# Patient Record
Sex: FEMALE | Race: WHITE | NOT HISPANIC OR LATINO | Employment: FULL TIME | URBAN - NONMETROPOLITAN AREA
[De-identification: names, ages, dates, MRNs, and addresses within clinical notes are randomized per-mention and may not be internally consistent; named-entity substitution may affect disease eponyms.]

---

## 2023-12-26 PROBLEM — I48.0 PAROXYSMAL ATRIAL FIBRILLATION (MULTI): Status: ACTIVE | Noted: 2023-12-26

## 2023-12-26 PROBLEM — C18.9 COLON CANCER (MULTI): Status: ACTIVE | Noted: 2023-12-26

## 2023-12-26 PROBLEM — Z86.711 HISTORY OF PULMONARY EMBOLUS (PE): Status: ACTIVE | Noted: 2023-12-26

## 2023-12-26 RX ORDER — MULTIVIT-MIN/IRON/FOLIC ACID/K 18-600-40
1 CAPSULE ORAL DAILY
COMMUNITY

## 2023-12-26 RX ORDER — UBIDECARENONE/VIT E ACET 100MG-5
1 CAPSULE ORAL DAILY
COMMUNITY

## 2023-12-26 RX ORDER — ACETAMINOPHEN 160 MG/5ML
1 SUSPENSION, ORAL (FINAL DOSE FORM) ORAL DAILY
COMMUNITY

## 2023-12-26 RX ORDER — SOTALOL HYDROCHLORIDE 80 MG/1
80 TABLET ORAL EVERY 12 HOURS
COMMUNITY
End: 2024-01-02 | Stop reason: SDUPTHER

## 2023-12-26 RX ORDER — ACETAMINOPHEN 500 MG
1 TABLET ORAL DAILY
COMMUNITY

## 2023-12-26 RX ORDER — ASPIRIN 325 MG
1 TABLET ORAL DAILY
COMMUNITY
Start: 2022-02-22

## 2024-01-02 ENCOUNTER — OFFICE VISIT (OUTPATIENT)
Dept: CARDIOLOGY | Facility: CLINIC | Age: 54
End: 2024-01-02
Payer: COMMERCIAL

## 2024-01-02 VITALS
HEIGHT: 65 IN | HEART RATE: 94 BPM | BODY MASS INDEX: 27.69 KG/M2 | SYSTOLIC BLOOD PRESSURE: 122 MMHG | WEIGHT: 166.2 LBS | DIASTOLIC BLOOD PRESSURE: 80 MMHG

## 2024-01-02 DIAGNOSIS — Z86.711 HISTORY OF PULMONARY EMBOLUS (PE): ICD-10-CM

## 2024-01-02 DIAGNOSIS — I48.0 PAROXYSMAL ATRIAL FIBRILLATION (MULTI): Primary | ICD-10-CM

## 2024-01-02 PROCEDURE — 99214 OFFICE O/P EST MOD 30 MIN: CPT | Performed by: INTERNAL MEDICINE

## 2024-01-02 PROCEDURE — 93000 ELECTROCARDIOGRAM COMPLETE: CPT | Performed by: INTERNAL MEDICINE

## 2024-01-02 PROCEDURE — 1036F TOBACCO NON-USER: CPT | Performed by: INTERNAL MEDICINE

## 2024-01-02 NOTE — PROGRESS NOTES
"Subjective   Desi Johnson is a 53 y.o. female       Chief Complaint    Annual Exam          HPI     Patient returns in follow-up of problems as noted.  She done well.  She appears to be maintaining sinus rhythm based on EKG and lack of arrhythmia symptomatology.  She is protected against a thromboembolic event because she is also on rivaroxaban not only for atrial fibrillation but also for her unprovoked pulmonary embolism which necessitates lifelong antithrombotic therapy.  Because of this we will continue all therapies as before and she has been educated regarding symptoms watch for and encouraged to call if they arise or occur.    Review of Systems   All other systems reviewed and are negative.         Visit Vitals  /80 (BP Location: Right arm, Patient Position: Sitting)   Pulse 94   Ht 1.651 m (5' 5\")   Wt 75.4 kg (166 lb 3.2 oz)   BMI 27.66 kg/m²   Smoking Status Former   BSA 1.86 m²    EKG done in office today     Objective   Physical Exam  Constitutional:       Appearance: Normal appearance. She is normal weight.   HENT:      Nose: Nose normal.   Neck:      Vascular: No carotid bruit.   Cardiovascular:      Rate and Rhythm: Normal rate.      Pulses: Normal pulses.      Heart sounds: Normal heart sounds.   Pulmonary:      Effort: Pulmonary effort is normal.   Abdominal:      General: Bowel sounds are normal.      Palpations: Abdomen is soft.   Genitourinary:     Rectum: Normal.   Musculoskeletal:         General: Normal range of motion.      Cervical back: Normal range of motion.      Right lower leg: No edema.      Left lower leg: No edema.   Skin:     General: Skin is warm and dry.   Neurological:      General: No focal deficit present.      Mental Status: She is alert.   Psychiatric:         Mood and Affect: Mood normal.         Behavior: Behavior normal.         Thought Content: Thought content normal.         Judgment: Judgment normal.         Current Medications    Current Outpatient Medications: "     ascorbic acid, vitamin C, 500 mg capsule, Take 1 capsule by mouth once daily., Disp: , Rfl:     aspirin 325 mg tablet, Take 1 tablet (325 mg) by mouth once daily., Disp: , Rfl:     cholecalciferol (Vitamin D-3) 50 mcg (2,000 unit) capsule, Take 1 capsule (50 mcg) by mouth once daily., Disp: , Rfl:     coenzyme Q-10 200 mg capsule, Take 1 capsule (200 mg) by mouth once daily., Disp: , Rfl:     resveratroL 100 mg capsule, Take 1 capsule by mouth once daily., Disp: , Rfl:     rivaroxaban (Xarelto) 20 mg tablet, Take 1 tablet (20 mg) by mouth once daily., Disp: , Rfl:     sotalol (Betapace) 80 mg tablet, Take 1 tablet (80 mg) by mouth every 12 hours., Disp: , Rfl:                      Assessment/Plan   1. Paroxysmal atrial fibrillation (CMS/HCC)  No recurrence on sotalol therapy.  Continue same.  QT interval acceptable.    2. History of pulmonary embolus (PE)  Unprovoked, necessitating lifelong antithrombotic therapy.  This antithrombotic therapy also mitigates risk of stroke associated with atrial fibrillation.         Scribe Attestation  By signing my name below, I, Ani Quarles LPN   attest that this documentation has been prepared under the direction and in the presence of Herman Melgar MD.

## 2024-01-02 NOTE — LETTER
January 2, 2024     Jamir Andrew, DO  [address] [place] [address]    Patient: Desi Johnson   YOB: 1970   Date of Visit: 1/2/2024       Dear Dr. Jamir Andrew, DO:    Thank you for referring Desi Johnson to me for evaluation. Below are my notes for this consultation.  If you have questions, please do not hesitate to call me. I look forward to following your patient along with you.       Sincerely,     Herman Melgar MD      CC: No Recipients  ______________________________________________________________________________________

## 2024-01-05 RX ORDER — SOTALOL HYDROCHLORIDE 80 MG/1
80 TABLET ORAL EVERY 12 HOURS
Qty: 180 TABLET | Refills: 3 | Status: SHIPPED | OUTPATIENT
Start: 2024-01-05 | End: 2025-01-04

## 2025-01-07 ENCOUNTER — OFFICE VISIT (OUTPATIENT)
Dept: CARDIOLOGY | Facility: CLINIC | Age: 55
End: 2025-01-07
Payer: COMMERCIAL

## 2025-01-07 ENCOUNTER — APPOINTMENT (OUTPATIENT)
Dept: CARDIOLOGY | Facility: CLINIC | Age: 55
End: 2025-01-07
Payer: COMMERCIAL

## 2025-01-07 VITALS
SYSTOLIC BLOOD PRESSURE: 130 MMHG | HEART RATE: 88 BPM | HEIGHT: 65 IN | DIASTOLIC BLOOD PRESSURE: 86 MMHG | WEIGHT: 185.2 LBS | BODY MASS INDEX: 30.86 KG/M2

## 2025-01-07 DIAGNOSIS — Z79.01 LONG TERM CURRENT USE OF ANTICOAGULANT THERAPY: Primary | ICD-10-CM

## 2025-01-07 DIAGNOSIS — Z86.711 HISTORY OF PULMONARY EMBOLUS (PE): ICD-10-CM

## 2025-01-07 DIAGNOSIS — I48.0 PAROXYSMAL ATRIAL FIBRILLATION (MULTI): ICD-10-CM

## 2025-01-07 PROCEDURE — 1036F TOBACCO NON-USER: CPT | Performed by: NURSE PRACTITIONER

## 2025-01-07 PROCEDURE — 93000 ELECTROCARDIOGRAM COMPLETE: CPT | Performed by: NURSE PRACTITIONER

## 2025-01-07 PROCEDURE — 3008F BODY MASS INDEX DOCD: CPT | Performed by: NURSE PRACTITIONER

## 2025-01-07 PROCEDURE — 99213 OFFICE O/P EST LOW 20 MIN: CPT | Performed by: NURSE PRACTITIONER

## 2025-01-07 RX ORDER — SOTALOL HYDROCHLORIDE 80 MG/1
80 TABLET ORAL EVERY 12 HOURS
Qty: 180 TABLET | Refills: 3 | Status: SHIPPED | OUTPATIENT
Start: 2025-01-07 | End: 2026-01-07

## 2025-01-07 RX ORDER — SEMAGLUTIDE 2.68 MG/ML
INJECTION, SOLUTION SUBCUTANEOUS WEEKLY
COMMUNITY

## 2025-01-07 ASSESSMENT — ENCOUNTER SYMPTOMS
DYSPNEA ON EXERTION: 0
NEAR-SYNCOPE: 0
SYNCOPE: 0
ORTHOPNEA: 0
PND: 0
PALPITATIONS: 0
IRREGULAR HEARTBEAT: 0

## 2025-01-07 NOTE — PATIENT INSTRUCTIONS
Please bring all medicines, vitamins, and herbal supplements with you when you come to the office.    Prescriptions will not be filled unless you are compliant with your follow up appointments or have a follow up appointment scheduled as per instruction of your physician. Refills should be requested at the time of your visit.     EKG done in office today     PLAN:   Through informed decision making process incorporating patients unique circumstances, the following treatment plan will be initiated:    1.  Prescription drug management of cardiovascular medication for efficacy, adherence to treatment, side effect assessment and polypharmacy. Current treatment clinically warranted and to continue without modifications.    2.  If you are interested calcium score then let me know    3. Return for follow-up; in the interim, contact the office if new symptoms arise.  Dr. Mckenna 6 months

## 2025-01-07 NOTE — PROGRESS NOTES
"Chief Complaint  \" Doing good\"    Reason for Visit  Annual follow-up  Patient presents to the office today for outpatient follow-up for atrial fibrillation and high risk med use.  Last evaluated in clinic by Dr. Sky January 2024.    Presents today ambulatory with steady gait.  Accompanied by patient  Patient denies any hospitalizations or significant changes to interval medical history since last office follow-up.   She follows routinely with PCP.    History of Present Illness   Patient is an extremely pleasant 54-year-old female who presents to the office with no voiced cardiovascular complaints.  She remains aerobically active where she walks a couple miles on a regular basis denying any type of exertional symptoms.  In regards to atrial fibrillation she reports being symptomatic and has maybe had brief episodes over the course of the year but relates to maybe she forgot some of her medication.  She does not utilize caffeine, social alcohol intake, no prior obstructive sleep apnea workup.    Reviewed prior history-she had unprovoked PE in 2017 and has been maintained on anticoagulation since that time.    No prior ischemic evaluation.  Reviewed availability of calcium score and she may consider.    Patient reports that overall has no complaint(s) of chest pain, chest pressure/discomfort, claudication, dyspnea, exertional chest pressure/discomfort, and fatigue    Daily activity:    Greater than 4 METS  Denies any change in exercise capacity or functional tolerance since last office visit.    The importance of primary prevention reviewed:  HTN: None  HLD: Untreated, has annual wellness labs  DM: Denies  Smoker: Denies  BMI:  Reviewed the merits of healthy lifestyle choices on overall cardiovascular health.      Review of Systems   Cardiovascular:  Negative for chest pain, dyspnea on exertion, irregular heartbeat, leg swelling, near-syncope, orthopnea, palpitations, paroxysmal nocturnal dyspnea and syncope.    " "    Visit Vitals  /86 (BP Location: Right arm, Patient Position: Sitting)   Pulse 88   Ht 1.651 m (5' 5\")   Wt 84 kg (185 lb 3.2 oz)   BMI 30.82 kg/m²   Smoking Status Former   BSA 1.96 m²     Physical Exam  Vitals and nursing note reviewed.   HENT:      Head: Normocephalic.   Cardiovascular:      Rate and Rhythm: Normal rate and regular rhythm.      Heart sounds: Normal heart sounds.   Pulmonary:      Effort: Pulmonary effort is normal.      Breath sounds: Normal breath sounds.   Abdominal:      Palpations: Abdomen is soft.   Musculoskeletal:      Right lower leg: No edema.      Left lower leg: No edema.   Skin:     General: Skin is warm and dry.   Neurological:      General: No focal deficit present.      Mental Status: She is alert.   Psychiatric:         Mood and Affect: Mood normal.         Behavior: Behavior normal.      No Known Allergies    Current Outpatient Medications   Medication Instructions    ascorbic acid, vitamin C, 500 mg capsule 1 capsule, Daily    cholecalciferol (Vitamin D-3) 50 mcg (2,000 unit) capsule 1 capsule, Daily    coenzyme Q-10 200 mg capsule 1 capsule, Daily    Ozempic 2 mg/dose (8 mg/3 mL) pen injector Weekly    resveratroL 100 mg capsule 1 capsule, Daily    rivaroxaban (XARELTO) 20 mg, oral, Daily    sotalol (BETAPACE) 80 mg, oral, Every 12 hours      Assessment:    Paroxysmal atrial fibrillation (Multi)  March 2016 seen inpatient cardiology consult by Dr. Sky due to paroxysmal atrial flutter at time of right hemicolectomy -she was placed on sotalol at that time.    EKG in office maintaining sinus rhythm on sotalol 80 mg twice daily  QTc 471  Creatinine 0.77    History of pulmonary embolus (PE)  She had unprovoked PE in 2017 (occurred 1 year after colon cancer treatment)  Has been maintained on anticoagulation since that time without bleeding diathesis    Long term current use of anticoagulant therapy  Due to 2017 unprovoked PE  CHADS VASc 0    BMI 30.0-30.9,adult  Recently " started Ozempic and is making lifestyle changes    Cardiac and Vasculature  2017 TTE  LVEF 50%  Left atrium normal    No prior ischemic evaluation    Plan:     Through informed decision making process incorporating patients unique circumstances, the following treatment plan will be initiated:    1.  Prescription drug management of cardiovascular medication for efficacy, adherence to treatment, side effect assessment and polypharmacy. Current treatment clinically warranted and to continue without modifications.    2.  If you are interested calcium score then let me know    3. Return for follow-up; in the interim, contact the office if new symptoms arise.  Dr. Mckenna 6 months     Sana Conn MSN, APRN-CNP, PMHNP-[facility] [place]  [place], [place]     Please excuse any errors in grammar or translation related to this dictation. Voice recognition software was utilized to prepare this document.

## 2025-01-07 NOTE — LETTER
"January 7, 2025     Jamir Andrew, DO  [address] [place] [address]    Patient: Desi Johnson   YOB: 1970   Date of Visit: 1/7/2025       Dear Dr. Jamir Andrew, DO:    Thank you for referring Desi Johnson to me for evaluation. Below are my notes for this consultation.  If you have questions, please do not hesitate to call me. I look forward to following your patient along with you.       Sincerely,     Sana Conn, APRN-CNP      CC: No Recipients  ______________________________________________________________________________________    Chief Complaint  \" Doing good\"    Reason for Visit  Annual follow-up  Patient presents to the office today for outpatient follow-up for atrial fibrillation and high risk med use.  Last evaluated in clinic by Dr. Sky January 2024.    Presents today ambulatory with steady gait.  Accompanied by patient  Patient denies any hospitalizations or significant changes to interval medical history since last office follow-up.   She follows routinely with PCP.    History of Present Illness   Patient is an extremely pleasant 54-year-old female who presents to the office with no voiced cardiovascular complaints.  She remains aerobically active where she walks a couple miles on a regular basis denying any type of exertional symptoms.  In regards to atrial fibrillation she reports being symptomatic and has maybe had brief episodes over the course of the year but relates to maybe she forgot some of her medication.  She does not utilize caffeine, social alcohol intake, no prior obstructive sleep apnea workup.    Reviewed prior history-she had unprovoked PE in 2017 and has been maintained on anticoagulation since that time.    No prior ischemic evaluation.  Reviewed availability of calcium score and she may consider.    Patient reports that overall has no complaint(s) of chest pain, chest pressure/discomfort, claudication, dyspnea, exertional chest pressure/discomfort, " "and fatigue    Daily activity:    Greater than 4 METS  Denies any change in exercise capacity or functional tolerance since last office visit.    The importance of primary prevention reviewed:  HTN: None  HLD: Untreated, has annual wellness labs  DM: Denies  Smoker: Denies  BMI:  Reviewed the merits of healthy lifestyle choices on overall cardiovascular health.      Review of Systems   Cardiovascular:  Negative for chest pain, dyspnea on exertion, irregular heartbeat, leg swelling, near-syncope, orthopnea, palpitations, paroxysmal nocturnal dyspnea and syncope.        Visit Vitals  /86 (BP Location: Right arm, Patient Position: Sitting)   Pulse 88   Ht 1.651 m (5' 5\")   Wt 84 kg (185 lb 3.2 oz)   BMI 30.82 kg/m²   Smoking Status Former   BSA 1.96 m²     Physical Exam  Vitals and nursing note reviewed.   HENT:      Head: Normocephalic.   Cardiovascular:      Rate and Rhythm: Normal rate and regular rhythm.      Heart sounds: Normal heart sounds.   Pulmonary:      Effort: Pulmonary effort is normal.      Breath sounds: Normal breath sounds.   Abdominal:      Palpations: Abdomen is soft.   Musculoskeletal:      Right lower leg: No edema.      Left lower leg: No edema.   Skin:     General: Skin is warm and dry.   Neurological:      General: No focal deficit present.      Mental Status: She is alert.   Psychiatric:         Mood and Affect: Mood normal.         Behavior: Behavior normal.      No Known Allergies    Current Outpatient Medications   Medication Instructions   • ascorbic acid, vitamin C, 500 mg capsule 1 capsule, Daily   • cholecalciferol (Vitamin D-3) 50 mcg (2,000 unit) capsule 1 capsule, Daily   • coenzyme Q-10 200 mg capsule 1 capsule, Daily   • Ozempic 2 mg/dose (8 mg/3 mL) pen injector Weekly   • resveratroL 100 mg capsule 1 capsule, Daily   • rivaroxaban (XARELTO) 20 mg, oral, Daily   • sotalol (BETAPACE) 80 mg, oral, Every 12 hours      Assessment:    Paroxysmal atrial fibrillation (Multi)  March " 2016 seen inpatient cardiology consult by Dr. Sky due to paroxysmal atrial flutter at time of right hemicolectomy -she was placed on sotalol at that time.    EKG in office maintaining sinus rhythm on sotalol 80 mg twice daily  QTc 471  Creatinine 0.77    History of pulmonary embolus (PE)  She had unprovoked PE in 2017 (occurred 1 year after colon cancer treatment)  Has been maintained on anticoagulation since that time without bleeding diathesis    Long term current use of anticoagulant therapy  Due to 2017 unprovoked PE  CHADS VASc 0    BMI 30.0-30.9,adult  Recently started Ozempic and is making lifestyle changes    Cardiac and Vasculature  2017 TTE  LVEF 50%  Left atrium normal    No prior ischemic evaluation    Plan:     Through informed decision making process incorporating patients unique circumstances, the following treatment plan will be initiated:    1.  Prescription drug management of cardiovascular medication for efficacy, adherence to treatment, side effect assessment and polypharmacy. Current treatment clinically warranted and to continue without modifications.    2.  If you are interested calcium score then let me know    3. Return for follow-up; in the interim, contact the office if new symptoms arise.  Dr. Mckenna 6 months     Sana Conn MSN, APRN-CNP, PMHNP-[facility] [place]  [place], [place]     Please excuse any errors in grammar or translation related to this dictation. Voice recognition software was utilized to prepare this document.

## 2025-01-07 NOTE — ASSESSMENT & PLAN NOTE
She had unprovoked PE in 2017 (occurred 1 year after colon cancer treatment)  Has been maintained on anticoagulation since that time without bleeding diathesis

## 2025-01-07 NOTE — ASSESSMENT & PLAN NOTE
March 2016 seen inpatient cardiology consult by Dr. Sky due to paroxysmal atrial flutter at time of right hemicolectomy -she was placed on sotalol at that time.    EKG in office maintaining sinus rhythm on sotalol 80 mg twice daily  QTc 471  Creatinine 0.77

## 2025-08-27 ENCOUNTER — APPOINTMENT (OUTPATIENT)
Dept: CARDIOLOGY | Facility: CLINIC | Age: 55
End: 2025-08-27
Payer: COMMERCIAL

## 2025-09-08 ENCOUNTER — APPOINTMENT (OUTPATIENT)
Dept: CARDIOLOGY | Facility: CLINIC | Age: 55
End: 2025-09-08
Payer: COMMERCIAL